# Patient Record
Sex: MALE | Race: WHITE | Employment: STUDENT | URBAN - METROPOLITAN AREA
[De-identification: names, ages, dates, MRNs, and addresses within clinical notes are randomized per-mention and may not be internally consistent; named-entity substitution may affect disease eponyms.]

---

## 2019-07-01 ENCOUNTER — OFFICE VISIT (OUTPATIENT)
Dept: FAMILY MEDICINE | Facility: CLINIC | Age: 24
End: 2019-07-01
Payer: COMMERCIAL

## 2019-07-01 VITALS
HEART RATE: 76 BPM | BODY MASS INDEX: 24.04 KG/M2 | HEIGHT: 68 IN | DIASTOLIC BLOOD PRESSURE: 70 MMHG | WEIGHT: 158.6 LBS | OXYGEN SATURATION: 99 % | SYSTOLIC BLOOD PRESSURE: 114 MMHG | TEMPERATURE: 98.6 F

## 2019-07-01 DIAGNOSIS — Z00.00 ROUTINE GENERAL MEDICAL EXAMINATION AT A HEALTH CARE FACILITY: Primary | ICD-10-CM

## 2019-07-01 DIAGNOSIS — Z11.3 SCREEN FOR STD (SEXUALLY TRANSMITTED DISEASE): ICD-10-CM

## 2019-07-01 PROCEDURE — 99385 PREV VISIT NEW AGE 18-39: CPT | Performed by: FAMILY MEDICINE

## 2019-07-01 PROCEDURE — 36415 COLL VENOUS BLD VENIPUNCTURE: CPT | Performed by: FAMILY MEDICINE

## 2019-07-01 PROCEDURE — 87389 HIV-1 AG W/HIV-1&-2 AB AG IA: CPT | Performed by: FAMILY MEDICINE

## 2019-07-01 PROCEDURE — 87591 N.GONORRHOEAE DNA AMP PROB: CPT | Performed by: FAMILY MEDICINE

## 2019-07-01 PROCEDURE — 86780 TREPONEMA PALLIDUM: CPT | Performed by: FAMILY MEDICINE

## 2019-07-01 PROCEDURE — 87491 CHLMYD TRACH DNA AMP PROBE: CPT | Performed by: FAMILY MEDICINE

## 2019-07-01 ASSESSMENT — MIFFLIN-ST. JEOR: SCORE: 1680.96

## 2019-07-01 NOTE — PROGRESS NOTES
SUBJECTIVE:   CC: Baltazar Deal is an 23 year old male who presents for preventative health visit.     [facility] on 5th-13  [facility] 14 July -sept 2020- sent by work- urban, [facility] exp community    Healthy Habits:     Getting at least 3 servings of Calcium per day:  Yes    Bi-annual eye exam:  Yes    Dental care twice a year:  Yes    Sleep apnea or symptoms of sleep apnea:  None    Diet:  Regular (no restrictions)    Frequency of exercise:  6-7 days/week    Duration of exercise:  45-60 minutes    Taking medications regularly:  Yes    Medication side effects:  Not applicable    PHQ-2 Total Score: 0    Additional concerns today:  No          PROBLEMS TO ADD ON...    Today's PHQ-2 Score:   PHQ-2 ( 1999 [employer]) 7/1/2019   Q1: Little interest or pleasure in doing things 0   Q2: Feeling down, depressed or hopeless 0   PHQ-2 Score 0   Q1: Little interest or pleasure in doing things Not at all   Q2: Feeling down, depressed or hopeless Not at all   PHQ-2 Score 0       Abuse: Current or Past(Physical, Sexual or Emotional)- No  Do you feel safe in your environment? Yes    Social History     Tobacco Use     Smoking status: Never Smoker     Smokeless tobacco: Never Used   Substance Use Topics     Alcohol use: Yes     Comment: occasional     If you drink alcohol do you typically have >3 drinks per day or >7 drinks per week? No    Alcohol Use 7/1/2019   Prescreen: >3 drinks/day or >7 drinks/week? No   Prescreen: >3 drinks/day or >7 drinks/week? -   No flowsheet data found.    Last PSA: No results found for: PSA    Reviewed orders with patient. Reviewed health maintenance and updated orders accordingly - Yes  BP Readings from Last 3 Encounters:   07/01/19 114/70   11/28/16 124/73   10/24/16 102/68    Wt Readings from Last 3 Encounters:   07/01/19 71.9 kg (158 lb 9.6 oz)   10/03/16 64.3 kg (141 lb 12.8 oz)   05/30/16 63.5 kg (140 lb)                    Reviewed and updated as needed this visit by clinical staff  Tobacco   "Allergies  Meds  Soc Hx        Reviewed and updated as needed this visit by Provider            Review of Systems  CONSTITUTIONAL: NEGATIVE for fever, chills, change in weight  INTEGUMENTARY/SKIN: NEGATIVE for worrisome rashes, moles or lesions  EYES: NEGATIVE for vision changes or irritation  ENT: NEGATIVE for ear, mouth and throat problems  RESP: NEGATIVE for significant cough or SOB  CV: NEGATIVE for chest pain, palpitations or peripheral edema  GI: NEGATIVE for nausea, abdominal pain, heartburn, or change in bowel habits   male: negative for dysuria, hematuria, decreased urinary stream, erectile dysfunction, urethral discharge  MUSCULOSKELETAL: NEGATIVE for significant arthralgias or myalgia  NEURO: NEGATIVE for weakness, dizziness or paresthesias  PSYCHIATRIC: NEGATIVE for changes in mood or affect    OBJECTIVE:   /70   Pulse 76   Temp 98.6  F (37  C) (Oral)   Ht 1.715 m (5' 7.5\")   Wt 71.9 kg (158 lb 9.6 oz)   SpO2 99%   BMI 24.47 kg/m      Physical Exam  GENERAL: healthy, alert and no distress  EYES: Eyes grossly normal to inspection, PERRL and conjunctivae and sclerae normal  HENT: ear canals and TM's normal, nose and mouth without ulcers or lesions  NECK: no adenopathy, no asymmetry, masses, or scars and thyroid normal to palpation  RESP: lungs clear to auscultation - no rales, rhonchi or wheezes  CV: regular rate and rhythm, normal S1 S2, no S3 or S4, no murmur, click or rub, no peripheral edema and peripheral pulses strong  ABDOMEN: soft, nontender, no hepatosplenomegaly, no masses and bowel sounds normal  MS: no gross musculoskeletal defects noted, no edema  SKIN: no suspicious lesions or rashes  NEURO: Normal strength and tone, mentation intact and speech normal  PSYCH: mentation appears normal, affect normal/bright    Diagnostic Test Results:  Labs reviewed in Epic    ASSESSMENT/PLAN:   1. Routine general medical examination at a health care facility  Please see patient instructions " "    2. Screen for STD (sexually transmitted disease)  - Chlamydia trachomatis PCR  - Neisseria gonorrhoeae PCR  - Treponema Abs w Reflex to RPR and Titer  - HIV Antigen Antibody Combo    Advised separate appointment for travel clinic   And in future make such appointment for at least 4 weeks in advance    COUNSELING:   Reviewed preventive health counseling, as reflected in patient instructions       Regular exercise       Healthy diet/nutrition    Estimated body mass index is 24.47 kg/m  as calculated from the following:    Height as of this encounter: 1.715 m (5' 7.5\").    Weight as of this encounter: 71.9 kg (158 lb 9.6 oz).          reports that he has never smoked. He has never used smokeless tobacco.      Counseling Resources:  ATP IV Guidelines  Pooled Cohorts Equation Calculator  FRAX Risk Assessment  ICSI Preventive Guidelines  Dietary Guidelines for Americans, 2010  USDA's MyPlate  ASA Prophylaxis  Lung CA Screening    Jeannie Luu MD  [facility]  "

## 2019-07-01 NOTE — PATIENT INSTRUCTIONS
Travel clinic should be a separate appointment, at least 1 month in advance to cover all the improtant topic for travel related issues  Let me see what can be done to accommodate it- with a follow up to discuss only a few of the oral medications- that should be discuss before travel to  [place].    Preventive Health Recommendations  Male Ages 21 - 25     Yearly exam:             See your health care provider every year in order to  o   Review health changes.   o   Discuss preventive care.    o   Review your medicines if your doctor has prescribed any.    You should be tested each year for STDs (sexually transmitted diseases).     Talk to your provider about cholesterol testing.      If you are at risk for diabetes, you should have a diabetes test (fasting glucose).    Shots: Get a flu shot each year. Get a tetanus shot every 10 years.     Nutrition:    Eat at least 5 servings of fruits and vegetables daily.     Eat whole-grain bread, whole-wheat pasta and brown rice instead of white grains and rice.     Get adequate calcium and Vitamin D.     Lifestyle    Exercise for at least 150 minutes a week (30 minutes a day, 5 days a week). This will help you control your weight and prevent disease.     Limit alcohol to one drink per day.     No smoking.     Wear sunscreen to prevent skin cancer.     See your dentist every six months for an exam and cleaning.

## 2019-07-02 LAB
C TRACH DNA SPEC QL NAA+PROBE: NEGATIVE
HIV 1+2 AB+HIV1 P24 AG SERPL QL IA: NONREACTIVE
N GONORRHOEA DNA SPEC QL NAA+PROBE: NEGATIVE
SPECIMEN SOURCE: NORMAL
SPECIMEN SOURCE: NORMAL
T PALLIDUM AB SER QL: NONREACTIVE

## 2019-07-09 NOTE — RESULT ENCOUNTER NOTE
All negative    Negative chlamydia & gonorrhea, & HIV, human immunodeficiency virus  and syphilis    Please keep us posted with questions or concerns .      Best Regards,    Jeannie Luu MD  [facility]  [phone]

## 2020-03-01 ENCOUNTER — HEALTH MAINTENANCE LETTER (OUTPATIENT)
Age: 25
End: 2020-03-01

## 2020-12-13 ENCOUNTER — HEALTH MAINTENANCE LETTER (OUTPATIENT)
Age: 25
End: 2020-12-13

## 2021-04-17 ENCOUNTER — HEALTH MAINTENANCE LETTER (OUTPATIENT)
Age: 26
End: 2021-04-17

## 2021-09-26 ENCOUNTER — HEALTH MAINTENANCE LETTER (OUTPATIENT)
Age: 26
End: 2021-09-26

## 2022-05-08 ENCOUNTER — HEALTH MAINTENANCE LETTER (OUTPATIENT)
Age: 27
End: 2022-05-08

## 2023-01-14 ENCOUNTER — HEALTH MAINTENANCE LETTER (OUTPATIENT)
Age: 28
End: 2023-01-14

## 2023-06-02 ENCOUNTER — HEALTH MAINTENANCE LETTER (OUTPATIENT)
Age: 28
End: 2023-06-02

## 2024-11-13 ENCOUNTER — OFFICE VISIT (OUTPATIENT)
Dept: FAMILY MEDICINE CLINIC | Age: 29
End: 2024-11-13

## 2024-11-13 VITALS
OXYGEN SATURATION: 96 % | DIASTOLIC BLOOD PRESSURE: 80 MMHG | BODY MASS INDEX: 27.68 KG/M2 | TEMPERATURE: 98.5 F | SYSTOLIC BLOOD PRESSURE: 128 MMHG | HEART RATE: 66 BPM | WEIGHT: 186.9 LBS | HEIGHT: 69 IN | RESPIRATION RATE: 16 BRPM

## 2024-11-13 DIAGNOSIS — Z00.00 ROUTINE HEALTH MAINTENANCE: ICD-10-CM

## 2024-11-13 DIAGNOSIS — M25.562 CHRONIC PAIN OF LEFT KNEE: ICD-10-CM

## 2024-11-13 DIAGNOSIS — M23.52 RECURRENT LEFT KNEE INSTABILITY: Primary | ICD-10-CM

## 2024-11-13 DIAGNOSIS — G89.29 CHRONIC PAIN OF LEFT KNEE: ICD-10-CM

## 2024-11-13 NOTE — PROGRESS NOTES
[facility]  [address], [place] [address]  Phone: [phone]        Patient:  Eleno Tipton 29 y.o. male          Chief complaint:   Chief Complaint   Patient presents with   • New Patient     Pt states he needs to establish pc dr no concerns at this time       Assessment and Plan     Eleno was seen today for new patient.    Diagnoses and all orders for this visit:    Recurrent left knee instability  Chronic pain of left knee  Chronic and not controlled  In relation to skiing accident 3 years ago  Has completed PT  Since patient continues to participate in power lifting and is having knee instability with pain, will try for MRI  -     MRI KNEE LEFT WO CONTRAST; Future    Routine health maintenance  -     Hepatitis C Antibody; Future  -     Lipid, Fasting; Future  -     Comprehensive Metabolic Panel; Future  -     CBC with Auto Differential; Future  -     HIV Screen; Future  -     C.trachomatis N.gonorrhoeae DNA, Urine; Future    Other orders  -     Influenza, FLUCELVAX Trivalent, (age 6 mo+) IM, Preservative Free, 0.5mL        Please see Patient Instructions for further counseling and information given.        Advised to please be adherent to the treatment plans discussed today, and please call with any questions or concerns, letting the office know of any reasons that the plans may not be followed.  The risks of untreated conditions include worsening illness, injury, disability, and possibly, death. Please call if symptoms change in any way, worsen, or fail to completely resolve, as this could necessitate a change to treatment plans. Patient and/or caregiver expressed understanding.      Indications and proper use of medication(s) reviewed.  Potential side-effects and risks of medication(s) also explained.  Patient and/or caregiver was instructed to call if any new symptoms develop prior to next visit.     Health risk factors discussed and addressed.     Return to Office: